# Patient Record
Sex: FEMALE | Race: WHITE | URBAN - METROPOLITAN AREA
[De-identification: names, ages, dates, MRNs, and addresses within clinical notes are randomized per-mention and may not be internally consistent; named-entity substitution may affect disease eponyms.]

---

## 2019-06-06 ENCOUNTER — PROCEDURE VISIT (OUTPATIENT)
Dept: AUDIOLOGY | Age: 64
End: 2019-06-06

## 2019-06-06 DIAGNOSIS — H90.3 SENSORINEURAL HEARING LOSS, BILATERAL: Primary | ICD-10-CM

## 2019-06-06 PROCEDURE — MISCAUD MISC AUDIOLOGY SUPPLIES: Performed by: AUDIOLOGIST

## 2019-06-06 NOTE — PROGRESS NOTES
Patient seen for pickup of replaced left hearing aid. Patient's right hearing aid is dead and was sent for repair. Contact sister by phone or email when hearing aid is received, to schedule a pickup. (Ramsey Soto)    [email or web address]  [phone]    Patient taken to [facility] to pay replacement cost, in full.

## 2019-07-15 ENCOUNTER — PROCEDURE VISIT (OUTPATIENT)
Dept: AUDIOLOGY | Age: 64
End: 2019-07-15

## 2019-07-15 DIAGNOSIS — H90.3 SENSORINEURAL HEARING LOSS, BILATERAL: ICD-10-CM

## 2019-07-15 PROCEDURE — 99999 PR OFFICE/OUTPT VISIT,PROCEDURE ONLY: CPT | Performed by: AUDIOLOGIST

## 2020-01-01 ENCOUNTER — PROCEDURE VISIT (OUTPATIENT)
Dept: AUDIOLOGY | Age: 65
End: 2020-01-01

## 2020-01-01 PROCEDURE — 99999 PR OFFICE/OUTPT VISIT,PROCEDURE ONLY: CPT | Performed by: AUDIOLOGIST
